# Patient Record
Sex: FEMALE | Race: BLACK OR AFRICAN AMERICAN | Employment: UNEMPLOYED | URBAN - METROPOLITAN AREA
[De-identification: names, ages, dates, MRNs, and addresses within clinical notes are randomized per-mention and may not be internally consistent; named-entity substitution may affect disease eponyms.]

---

## 2020-01-23 LAB
HBV SURFACE AG SERPL QL IA: NON REACTIVE
HIV 1+2 AB+HIV1 P24 AG SERPL QL IA: NON REACTIVE

## 2020-08-02 ENCOUNTER — ANESTHESIA EVENT (OUTPATIENT)
Dept: OBGYN | Facility: CLINIC | Age: 20
End: 2020-08-02
Payer: COMMERCIAL

## 2020-08-02 ENCOUNTER — ANESTHESIA (OUTPATIENT)
Dept: OBGYN | Facility: CLINIC | Age: 20
End: 2020-08-02
Payer: COMMERCIAL

## 2020-08-02 ENCOUNTER — HOSPITAL ENCOUNTER (INPATIENT)
Facility: CLINIC | Age: 20
LOS: 1 days | Discharge: HOME HEALTH | End: 2020-08-03
Attending: OBSTETRICS & GYNECOLOGY | Admitting: OBSTETRICS & GYNECOLOGY
Payer: COMMERCIAL

## 2020-08-02 PROBLEM — Z36.89 ENCOUNTER FOR TRIAGE IN PREGNANT PATIENT: Status: ACTIVE | Noted: 2020-08-02

## 2020-08-02 PROBLEM — Z34.90 PREGNANCY: Status: ACTIVE | Noted: 2020-08-02

## 2020-08-02 LAB
ABO + RH BLD: NORMAL
ABO + RH BLD: NORMAL
BLD GP AB SCN SERPL QL: NORMAL
BLOOD BANK CMNT PATIENT-IMP: NORMAL
ERYTHROCYTE [DISTWIDTH] IN BLOOD BY AUTOMATED COUNT: 12.8 % (ref 10–15)
HCT VFR BLD AUTO: 38.8 % (ref 35–47)
HGB BLD-MCNC: 12.9 G/DL (ref 11.7–15.7)
LABORATORY COMMENT REPORT: NORMAL
MCH RBC QN AUTO: 29.7 PG (ref 26.5–33)
MCHC RBC AUTO-ENTMCNC: 33.2 G/DL (ref 31.5–36.5)
MCV RBC AUTO: 89 FL (ref 78–100)
PLATELET # BLD AUTO: 209 10E9/L (ref 150–450)
RBC # BLD AUTO: 4.34 10E12/L (ref 3.8–5.2)
SARS-COV-2 RNA SPEC QL NAA+PROBE: NEGATIVE
SARS-COV-2 RNA SPEC QL NAA+PROBE: NORMAL
SPECIMEN EXP DATE BLD: NORMAL
SPECIMEN SOURCE: NORMAL
SPECIMEN SOURCE: NORMAL
WBC # BLD AUTO: 8.1 10E9/L (ref 4–11)

## 2020-08-02 PROCEDURE — 86780 TREPONEMA PALLIDUM: CPT | Performed by: OBSTETRICS & GYNECOLOGY

## 2020-08-02 PROCEDURE — 86900 BLOOD TYPING SEROLOGIC ABO: CPT | Performed by: OBSTETRICS & GYNECOLOGY

## 2020-08-02 PROCEDURE — 86850 RBC ANTIBODY SCREEN: CPT | Performed by: OBSTETRICS & GYNECOLOGY

## 2020-08-02 PROCEDURE — 00HU33Z INSERTION OF INFUSION DEVICE INTO SPINAL CANAL, PERCUTANEOUS APPROACH: ICD-10-PCS | Performed by: ANESTHESIOLOGY

## 2020-08-02 PROCEDURE — 25000125 ZZHC RX 250: Performed by: ANESTHESIOLOGY

## 2020-08-02 PROCEDURE — 87653 STREP B DNA AMP PROBE: CPT | Performed by: OBSTETRICS & GYNECOLOGY

## 2020-08-02 PROCEDURE — 86901 BLOOD TYPING SEROLOGIC RH(D): CPT | Performed by: OBSTETRICS & GYNECOLOGY

## 2020-08-02 PROCEDURE — U0003 INFECTIOUS AGENT DETECTION BY NUCLEIC ACID (DNA OR RNA); SEVERE ACUTE RESPIRATORY SYNDROME CORONAVIRUS 2 (SARS-COV-2) (CORONAVIRUS DISEASE [COVID-19]), AMPLIFIED PROBE TECHNIQUE, MAKING USE OF HIGH THROUGHPUT TECHNOLOGIES AS DESCRIBED BY CMS-2020-01-R: HCPCS | Performed by: OBSTETRICS & GYNECOLOGY

## 2020-08-02 PROCEDURE — 25000125 ZZHC RX 250: Performed by: OBSTETRICS & GYNECOLOGY

## 2020-08-02 PROCEDURE — 0KQM0ZZ REPAIR PERINEUM MUSCLE, OPEN APPROACH: ICD-10-PCS | Performed by: OBSTETRICS & GYNECOLOGY

## 2020-08-02 PROCEDURE — 88307 TISSUE EXAM BY PATHOLOGIST: CPT | Performed by: OBSTETRICS & GYNECOLOGY

## 2020-08-02 PROCEDURE — 40000977 ZZH STATISTIC ATTENDANCE AT DELIVERY

## 2020-08-02 PROCEDURE — 85027 COMPLETE CBC AUTOMATED: CPT | Performed by: STUDENT IN AN ORGANIZED HEALTH CARE EDUCATION/TRAINING PROGRAM

## 2020-08-02 PROCEDURE — 88307 TISSUE EXAM BY PATHOLOGIST: CPT | Mod: 26 | Performed by: OBSTETRICS & GYNECOLOGY

## 2020-08-02 PROCEDURE — 72200001 ZZH LABOR CARE VAGINAL DELIVERY SINGLE

## 2020-08-02 PROCEDURE — 25000128 H RX IP 250 OP 636: Performed by: ANESTHESIOLOGY

## 2020-08-02 PROCEDURE — 59409 OBSTETRICAL CARE: CPT | Mod: GC | Performed by: OBSTETRICS & GYNECOLOGY

## 2020-08-02 PROCEDURE — 25000132 ZZH RX MED GY IP 250 OP 250 PS 637: Performed by: OBSTETRICS & GYNECOLOGY

## 2020-08-02 PROCEDURE — 12000001 ZZH R&B MED SURG/OB UMMC

## 2020-08-02 PROCEDURE — G0463 HOSPITAL OUTPT CLINIC VISIT: HCPCS

## 2020-08-02 PROCEDURE — 3E0R3BZ INTRODUCTION OF ANESTHETIC AGENT INTO SPINAL CANAL, PERCUTANEOUS APPROACH: ICD-10-PCS | Performed by: ANESTHESIOLOGY

## 2020-08-02 RX ORDER — HYDROCORTISONE 2.5 %
CREAM (GRAM) TOPICAL 3 TIMES DAILY PRN
Status: DISCONTINUED | OUTPATIENT
Start: 2020-08-02 | End: 2020-08-03 | Stop reason: HOSPADM

## 2020-08-02 RX ORDER — LIDOCAINE HYDROCHLORIDE 10 MG/ML
INJECTION, SOLUTION EPIDURAL; INFILTRATION; INTRACAUDAL; PERINEURAL
Status: DISCONTINUED
Start: 2020-08-02 | End: 2020-08-02 | Stop reason: HOSPADM

## 2020-08-02 RX ORDER — EPHEDRINE SULFATE 50 MG/ML
INJECTION, SOLUTION INTRAMUSCULAR; INTRAVENOUS; SUBCUTANEOUS
Status: DISCONTINUED
Start: 2020-08-02 | End: 2020-08-02 | Stop reason: WASHOUT

## 2020-08-02 RX ORDER — CARBOPROST TROMETHAMINE 250 UG/ML
250 INJECTION, SOLUTION INTRAMUSCULAR
Status: DISCONTINUED | OUTPATIENT
Start: 2020-08-02 | End: 2020-08-03

## 2020-08-02 RX ORDER — OXYTOCIN 10 [USP'U]/ML
INJECTION, SOLUTION INTRAMUSCULAR; INTRAVENOUS
Status: DISCONTINUED
Start: 2020-08-02 | End: 2020-08-02 | Stop reason: WASHOUT

## 2020-08-02 RX ORDER — METHYLERGONOVINE MALEATE 0.2 MG/ML
200 INJECTION INTRAVENOUS
Status: DISCONTINUED | OUTPATIENT
Start: 2020-08-02 | End: 2020-08-03

## 2020-08-02 RX ORDER — CITRIC ACID/SODIUM CITRATE 334-500MG
30 SOLUTION, ORAL ORAL ONCE
Status: DISCONTINUED | OUTPATIENT
Start: 2020-08-02 | End: 2020-08-03

## 2020-08-02 RX ORDER — AMOXICILLIN 250 MG
2 CAPSULE ORAL 2 TIMES DAILY
Status: DISCONTINUED | OUTPATIENT
Start: 2020-08-02 | End: 2020-08-03 | Stop reason: HOSPADM

## 2020-08-02 RX ORDER — SODIUM CHLORIDE, SODIUM LACTATE, POTASSIUM CHLORIDE, CALCIUM CHLORIDE 600; 310; 30; 20 MG/100ML; MG/100ML; MG/100ML; MG/100ML
INJECTION, SOLUTION INTRAVENOUS
Status: DISCONTINUED
Start: 2020-08-02 | End: 2020-08-02 | Stop reason: HOSPADM

## 2020-08-02 RX ORDER — LIDOCAINE HYDROCHLORIDE 10 MG/ML
INJECTION, SOLUTION EPIDURAL; INFILTRATION; INTRACAUDAL; PERINEURAL PRN
Status: DISCONTINUED | OUTPATIENT
Start: 2020-08-02 | End: 2020-08-03 | Stop reason: HOSPADM

## 2020-08-02 RX ORDER — NALBUPHINE HYDROCHLORIDE 20 MG/ML
2.5-5 INJECTION, SOLUTION INTRAMUSCULAR; INTRAVENOUS; SUBCUTANEOUS EVERY 6 HOURS PRN
Status: DISCONTINUED | OUTPATIENT
Start: 2020-08-02 | End: 2020-08-03

## 2020-08-02 RX ORDER — NALOXONE HYDROCHLORIDE 0.4 MG/ML
.1-.4 INJECTION, SOLUTION INTRAMUSCULAR; INTRAVENOUS; SUBCUTANEOUS
Status: DISCONTINUED | OUTPATIENT
Start: 2020-08-02 | End: 2020-08-03 | Stop reason: HOSPADM

## 2020-08-02 RX ORDER — OXYTOCIN/0.9 % SODIUM CHLORIDE 30/500 ML
100 PLASTIC BAG, INJECTION (ML) INTRAVENOUS CONTINUOUS
Status: DISCONTINUED | OUTPATIENT
Start: 2020-08-02 | End: 2020-08-03 | Stop reason: HOSPADM

## 2020-08-02 RX ORDER — NALOXONE HYDROCHLORIDE 0.4 MG/ML
.1-.4 INJECTION, SOLUTION INTRAMUSCULAR; INTRAVENOUS; SUBCUTANEOUS
Status: DISCONTINUED | OUTPATIENT
Start: 2020-08-02 | End: 2020-08-02

## 2020-08-02 RX ORDER — IBUPROFEN 800 MG/1
800 TABLET, FILM COATED ORAL EVERY 6 HOURS PRN
Status: DISCONTINUED | OUTPATIENT
Start: 2020-08-02 | End: 2020-08-03 | Stop reason: HOSPADM

## 2020-08-02 RX ORDER — OXYTOCIN 10 [USP'U]/ML
10 INJECTION, SOLUTION INTRAMUSCULAR; INTRAVENOUS
Status: DISCONTINUED | OUTPATIENT
Start: 2020-08-02 | End: 2020-08-03 | Stop reason: HOSPADM

## 2020-08-02 RX ORDER — NALBUPHINE HYDROCHLORIDE 20 MG/ML
2.5-5 INJECTION, SOLUTION INTRAMUSCULAR; INTRAVENOUS; SUBCUTANEOUS EVERY 6 HOURS PRN
Status: DISCONTINUED | OUTPATIENT
Start: 2020-08-02 | End: 2020-08-02

## 2020-08-02 RX ORDER — ONDANSETRON 2 MG/ML
4 INJECTION INTRAMUSCULAR; INTRAVENOUS EVERY 6 HOURS PRN
Status: DISCONTINUED | OUTPATIENT
Start: 2020-08-02 | End: 2020-08-03

## 2020-08-02 RX ORDER — CHOLECALCIFEROL (VITAMIN D3) 50 MCG
1 TABLET ORAL DAILY
COMMUNITY

## 2020-08-02 RX ORDER — LIDOCAINE HYDROCHLORIDE AND EPINEPHRINE 15; 5 MG/ML; UG/ML
INJECTION, SOLUTION EPIDURAL PRN
Status: DISCONTINUED | OUTPATIENT
Start: 2020-08-02 | End: 2020-08-03 | Stop reason: HOSPADM

## 2020-08-02 RX ORDER — OXYCODONE AND ACETAMINOPHEN 5; 325 MG/1; MG/1
1 TABLET ORAL
Status: DISCONTINUED | OUTPATIENT
Start: 2020-08-02 | End: 2020-08-03

## 2020-08-02 RX ORDER — LIDOCAINE 40 MG/G
CREAM TOPICAL
Status: DISCONTINUED | OUTPATIENT
Start: 2020-08-02 | End: 2020-08-03

## 2020-08-02 RX ORDER — MISOPROSTOL 200 UG/1
TABLET ORAL
Status: DISCONTINUED
Start: 2020-08-02 | End: 2020-08-02 | Stop reason: WASHOUT

## 2020-08-02 RX ORDER — EPHEDRINE SULFATE 50 MG/ML
5 INJECTION, SOLUTION INTRAMUSCULAR; INTRAVENOUS; SUBCUTANEOUS
Status: DISCONTINUED | OUTPATIENT
Start: 2020-08-02 | End: 2020-08-03

## 2020-08-02 RX ORDER — EPHEDRINE SULFATE 50 MG/ML
5 INJECTION, SOLUTION INTRAMUSCULAR; INTRAVENOUS; SUBCUTANEOUS
Status: DISCONTINUED | OUTPATIENT
Start: 2020-08-02 | End: 2020-08-02

## 2020-08-02 RX ORDER — NALOXONE HYDROCHLORIDE 0.4 MG/ML
.1-.4 INJECTION, SOLUTION INTRAMUSCULAR; INTRAVENOUS; SUBCUTANEOUS
Status: DISCONTINUED | OUTPATIENT
Start: 2020-08-02 | End: 2020-08-03

## 2020-08-02 RX ORDER — ACETAMINOPHEN 325 MG/1
650 TABLET ORAL EVERY 4 HOURS PRN
Status: DISCONTINUED | OUTPATIENT
Start: 2020-08-02 | End: 2020-08-03

## 2020-08-02 RX ORDER — OXYTOCIN/0.9 % SODIUM CHLORIDE 30/500 ML
PLASTIC BAG, INJECTION (ML) INTRAVENOUS
Status: DISCONTINUED
Start: 2020-08-02 | End: 2020-08-02 | Stop reason: HOSPADM

## 2020-08-02 RX ORDER — OXYTOCIN 10 [USP'U]/ML
10 INJECTION, SOLUTION INTRAMUSCULAR; INTRAVENOUS
Status: DISCONTINUED | OUTPATIENT
Start: 2020-08-02 | End: 2020-08-03

## 2020-08-02 RX ORDER — FENTANYL/BUPIVACAINE/NS/PF 2-1250MCG
PLASTIC BAG, INJECTION (ML) INJECTION
Status: COMPLETED
Start: 2020-08-02 | End: 2020-08-02

## 2020-08-02 RX ORDER — OXYTOCIN/0.9 % SODIUM CHLORIDE 30/500 ML
340 PLASTIC BAG, INJECTION (ML) INTRAVENOUS CONTINUOUS PRN
Status: DISCONTINUED | OUTPATIENT
Start: 2020-08-02 | End: 2020-08-03 | Stop reason: HOSPADM

## 2020-08-02 RX ORDER — MODIFIED LANOLIN
OINTMENT (GRAM) TOPICAL
Status: DISCONTINUED | OUTPATIENT
Start: 2020-08-02 | End: 2020-08-03 | Stop reason: HOSPADM

## 2020-08-02 RX ORDER — OXYTOCIN/0.9 % SODIUM CHLORIDE 30/500 ML
100-340 PLASTIC BAG, INJECTION (ML) INTRAVENOUS CONTINUOUS PRN
Status: COMPLETED | OUTPATIENT
Start: 2020-08-02 | End: 2020-08-02

## 2020-08-02 RX ORDER — BISACODYL 10 MG
10 SUPPOSITORY, RECTAL RECTAL DAILY PRN
Status: DISCONTINUED | OUTPATIENT
Start: 2020-08-04 | End: 2020-08-03 | Stop reason: HOSPADM

## 2020-08-02 RX ORDER — AMOXICILLIN 250 MG
1 CAPSULE ORAL 2 TIMES DAILY
Status: DISCONTINUED | OUTPATIENT
Start: 2020-08-02 | End: 2020-08-03 | Stop reason: HOSPADM

## 2020-08-02 RX ORDER — IBUPROFEN 800 MG/1
800 TABLET, FILM COATED ORAL
Status: DISCONTINUED | OUTPATIENT
Start: 2020-08-02 | End: 2020-08-03

## 2020-08-02 RX ORDER — ACETAMINOPHEN 325 MG/1
650 TABLET ORAL EVERY 4 HOURS PRN
Status: DISCONTINUED | OUTPATIENT
Start: 2020-08-02 | End: 2020-08-03 | Stop reason: HOSPADM

## 2020-08-02 RX ORDER — SODIUM CHLORIDE, SODIUM LACTATE, POTASSIUM CHLORIDE, CALCIUM CHLORIDE 600; 310; 30; 20 MG/100ML; MG/100ML; MG/100ML; MG/100ML
INJECTION, SOLUTION INTRAVENOUS CONTINUOUS
Status: DISCONTINUED | OUTPATIENT
Start: 2020-08-02 | End: 2020-08-03

## 2020-08-02 RX ADMIN — LIDOCAINE HYDROCHLORIDE 1 ML: 10 INJECTION, SOLUTION EPIDURAL; INFILTRATION; INTRACAUDAL; PERINEURAL at 13:12

## 2020-08-02 RX ADMIN — Medication 340 ML/HR: at 14:07

## 2020-08-02 RX ADMIN — LIDOCAINE HYDROCHLORIDE,EPINEPHRINE BITARTRATE 3 ML: 15; .005 INJECTION, SOLUTION EPIDURAL; INFILTRATION; INTRACAUDAL; PERINEURAL at 13:10

## 2020-08-02 RX ADMIN — ACETAMINOPHEN 650 MG: 325 TABLET ORAL at 23:59

## 2020-08-02 RX ADMIN — LIDOCAINE HYDROCHLORIDE,EPINEPHRINE BITARTRATE 2 ML: 15; .005 INJECTION, SOLUTION EPIDURAL; INFILTRATION; INTRACAUDAL; PERINEURAL at 13:12

## 2020-08-02 RX ADMIN — LIDOCAINE HYDROCHLORIDE 10 ML: 10 INJECTION, SOLUTION EPIDURAL; INFILTRATION; INTRACAUDAL; PERINEURAL at 14:18

## 2020-08-02 RX ADMIN — Medication 10 ML/HR: at 13:14

## 2020-08-02 RX ADMIN — IBUPROFEN 800 MG: 800 TABLET, FILM COATED ORAL at 19:30

## 2020-08-02 RX ADMIN — DOCUSATE SODIUM 50 MG AND SENNOSIDES 8.6 MG 1 TABLET: 8.6; 5 TABLET, FILM COATED ORAL at 19:32

## 2020-08-02 SDOH — HEALTH STABILITY: MENTAL HEALTH: HOW OFTEN DO YOU HAVE A DRINK CONTAINING ALCOHOL?: NEVER

## 2020-08-02 NOTE — PLAN OF CARE
Data: Bibiana Alberto transferred to [id number] via wheelchair at 1615. Baby transferred via parent's arms.  Action: Receiving unit notified of transfer: Yes. Patient and family notified of room change. Report given to Kiki Ferrari at 1620. Belongings sent to receiving unit. Accompanied by Registered Nurse. Oriented patient to surroundings. Call light within reach. ID bands double-checked with receiving RN.  Response: Patient tolerated transfer and is stable.

## 2020-08-02 NOTE — DISCHARGE SUMMARY
[facility] Discharge Summary    Bibiana Alberto MRN# [id number]   Age: 20 year old YOB: 2000     Date of Admission:  2020  Date of Discharge::  8/3/2020  Admitting Physician:  Cheri Aldridge MD  Discharge Physician:  Tawny Fuller MD          Admission Diagnoses:   -IUP at 40w2d  -Sporadic prenatal care  -GBS unknown  -Bilobed vs accessory lobe placenta          Discharge Diagnosis:   -IUP at 40w2d, now delivered  - Elevated blood pressure <4 hours apart  - GBS negative          Procedures:   Procedure(s): -  -Epidural          Medications Prior to Admission:     No medications prior to admission.             Discharge Medications:        Review of your medicines      START taking      Dose / Directions   acetaminophen 325 MG tablet  Commonly known as:  TYLENOL  Used for:   (normal spontaneous vaginal delivery)      Dose:  650 mg  Take 2 tablets (650 mg) by mouth every 6 hours as needed for mild pain Start after Delivery.  Quantity:  100 tablet  Refills:  0     ibuprofen 600 MG tablet  Commonly known as:  ADVIL/MOTRIN  Used for:   (normal spontaneous vaginal delivery)      Dose:  600 mg  Take 1 tablet (600 mg) by mouth every 6 hours as needed for moderate pain Start after delivery  Quantity:  60 tablet  Refills:  0     senna-docusate 8.6-50 MG tablet  Commonly known as:  SENOKOT-S/PERICOLACE  Used for:   (normal spontaneous vaginal delivery)      Dose:  1 tablet  Take 1 tablet by mouth daily Start after delivery.  Quantity:  100 tablet  Refills:  0        CONTINUE these medicines which have NOT CHANGED      Dose / Directions   vitamin D3 50 mcg (2000 units) tablet  Commonly known as:  CHOLECALCIFEROL      Dose:  1 tablet  Take 1 tablet by mouth daily  Refills:  0           Where to get your medicines      These medications were sent to [facility] [address]  [address] [address]    Phone:  [phone]      acetaminophen 325 MG tablet    ibuprofen 600 MG tablet    senna-docusate 8.6-50 MG tablet             Consultations:   Anesthesia          Brief Admission History   Bibiana Alberto is a 20 year old  at 40w2d by LMP c/w 13w4d US who presents today in spontaneous labor. Started having contractions this morning at 9:00, becoming more frequent and painful throughout the day. Denies any VB, some increased vaginal discharge this morning but no LOF. Good FM. Denies SOB, chest pain, LE swelling/tenderness.  No concerns for headache, vision changes, RUQ or epigastric pain.         Brief Intrapartum Course:   Bibiana Alberto is a 20 year old , that initially presented to L&D in spontaneous labor. On admission her cervix was 6 cm dilated by RN. She progressed quickly to 9 cm and was feeling increased pressure. FHT notable for several prolonged decelerations, but with moderate variability, category II. For analgesia she received an epidural. At 13:15 she was found to be complete and immediately started pushing. She moved through second stage of labor with good maternal effort. At 14:06 she delivered a vigorous male infant in the HAI position. Shoulders delivered with ease. A body nuchal cord was noted and delivered through. Infant to mother's abdomen. Delayed cord clamping performed. Apgars were 8 and 9. Weight was 3280 g. Routine cord blood obtained. Placenta delivered spontaneously intact with 3-vessel cord. Placenta was noted to be intact with a small accessory lobe. Upon inspection of the perineum, a second degree laceration was noted. This was repaired with a 3-0 vicryl in the standard fashion and was hemostatic. Fundus firm. QBL 71 ml. Final counts correct. Dr. Aldridge was present for the entire delivery and repair.           Hospital Course:   The patient's hospital course was unremarkable.  On discharge, her pain was well controlled. Vaginal bleeding is similar to peak menstrual flow.  Voiding without  difficulty.  Ambulating well and tolerating a normal diet.  No fever.  Breastfeeding well.  Infant is stable.  No bowel movement yet.*  She was discharged on post-partum day #1.    Post-partum hemoglobin: 11.5    Declined birth control, partner in [place] and planning to return in 3 months          Discharge Instructions and Follow-Up:   Discharge diet: Regular   Discharge activity: Pelvic rest for 6 weeks including no sexual intercourse, tampons, or douching.    Discharge follow-up: Follow up with your primary OB for a routine postpartum visit in 6 weeks  Follow up in 1 week for BP check           Discharge Disposition:   Discharged to home in stable condition      Mirta Reyes MD PGY2  Obstetrics & Gynecology  08/06/20

## 2020-08-02 NOTE — ANESTHESIA PROCEDURE NOTES
Epidural Procedure Note  Staff -   Anesthesiologist:  Lizz Peacock MD      Performed By: anesthesiologist          Location: OB     Procedure start time:  8/2/2020 1:05 PM     Procedure end time:  8/2/2020 1:10 PM   Pre-procedure checklist:   patient identified, IV checked, site marked, risks and benefits discussed, informed consent, monitors and equipment checked, pre-op evaluation and at physician/surgeon's request      Correct Patient: Yes      Correct Position: Yes      Correct Site: Yes      Correct Procedure: Yes      Correct Laterality:  Yes    Site Marked:  Yes  Procedure:     Procedure:  Epidural catheter    ASA:  2    Diagnosis:  Labor    Position:  Sitting    Sterile Prep: chloraprep      Insertion site:  L3-4    Local skin infiltration:  1% lidocaine    amount (mL):  3    Approach:  Midline    Needle gauge (G):  17    Needle Length (in):  3.5    Block Needle Type:  Constantinuhtracey    Injection Technique:  LORT saline    FELICITAS at (cm):  7.5    Attempts:  1    Redirects:  0    Catheter gauge (G):  19    Catheter threaded easily: Yes      Threaded to cm at skin:  12.5    Threaded in epidural space (cm):  5    Paresthesias:  No    Aspiration negative for Heme or CSF: Yes      Test dose (mL):  3     Local anesthetic:  Lidocaine 1.5% w/ 1:200,000 epinephrine    Test dose time:  13:10    Test dose negative for signs of intravascular, subdural or intrathecal injection: Yes    Assessment/Narrative:      Patient tolerated procedure well. She was already 9cm dilated at time of epidural consult. Obstetricians evaluated and are ok with proceeding with epidural placement. Patient would like to have it done and able to sit still.   No paresthesia, no heme, no CSF. Patient immediately felt the urge to start pushing after the test dose - tegaderm placed at catheter insertion site and patient repositioned supine with RN. OB team at bedside. Infusion connected to epidural.

## 2020-08-02 NOTE — H&P
History and Physical     Bibiana Alberto MRN# [id number]   YOB: 2000 Age: 20 year old      Date of Admission: 2020    Primary care provider: [facility], [facility]      Assessment and Plan:       20 year old  at 40w2d by LMP c/w 13w4d US, here in spontaneous active labor. Pregnancy is notable for sporadic PNC.      Spontaneous Active Labor:  - 6 cm dilated by RN exam and painfully essie, will continue to monitor spontaneous labor  - Augmentation: pitocin and ROM prn after epidural placement   - Pain: would like epidural   - Anticipate      Sporadic PNC:  - Rh positive, Rubella immune, GCT nl, GBS unknown, Hgb 12.0 5/15/2020, Plts 273 2020  - Other prenatal labs wnl  - Imaging: Last US 3/2020 placenta posterior, bilobed vs accessory anterior lobe, EFW 87%ile    - No prior pap smear   - Contraception: will discuss PP  - Feeding: will discuss PP      FWB:   Cat II tracing, discontinuous but likely decel down to 60s lasting four minutes with spontaneous recovery;  Improvement with repositioning with moderate variability, no decelerations, and no accelerations.  cephalic by BSUS  - Continuous Fetal Monitoring  - Intrauterine resuscitative measures prn      Patient discussed with Dr. Cheri Aldridge           HPI:     Bibiana Alberto is a 20 year old  at 40w2d by LMP c/w 13w4d US who presents today in spontaneous labor. Started having contractions this morning at 9:00, becoming more frequent and painful throughout the day. Denies any VB, some increased vaginal discharge this morning but no LOF. Good FM. Denies SOB, chest pain, LE swelling/tenderness.  No concerns for headache, vision changes, RUQ or epigastric pain.    Pregnancy notable for:   -Sporadic prenatal care, seen at [facility] and [facility] (results in Care Everywhere)   -bilobed placenta  -EIF on ultrasound, normal NIPT    No prior pregnancies. No history of asthma or high blood pressure.    OB History:    OB History     Para Term  AB Living   1 0 0 0 0 0   SAB TAB Ectopic Multiple Live Births   0 0 0 0 0      # Outcome Date GA Lbr Eric/2nd Weight Sex Delivery Anes PTL Lv   1 Current                 Prenatal Lab Results:  ABO - O Rh-positive  Antibody negative  Rubella immune  Varicella immune   HepB non-reactive  HepC non-reactive   HIV negative   Gonorrhea negative  Chlamydia negative  Treponema negative   GCT nl, 118  GBS not collected, pt has not been seen since 2020    GBS Status:   No results found for: GBS             Past Medical History:   History reviewed. No pertinent past medical history.   Denies hx of HTN, asthma, or DM.           Past Surgical History:   History reviewed. No pertinent surgical history.          Social History:     Social History     Tobacco Use     Smoking status: Never Smoker     Smokeless tobacco: Never Used   Substance Use Topics     Alcohol use: Never     Frequency: Never             Family History:   History reviewed. No pertinent family history.          Immunizations:     There is no immunization history on file for this patient.         Allergies:   No Known Allergies          Medications:     Medications Prior to Admission   Medication Sig Dispense Refill Last Dose     vitamin D3 (CHOLECALCIFEROL) 50 mcg (2000 units) tablet Take 1 tablet by mouth daily   2020 at Unknown time             Review of Systems & Physical Exam:     The Review of Systems is negative other than noted in the HPI      /87   Temp 98.4  F (36.9  C) (Oral)   Resp 18   LMP 2019   Gen: Uncomfortable appearing  CV: Warm and well perfused   Lungs: Breathing comfortably on room air.   Abd: Gravid, non-tender, non-distended  Ext: Trace peripheral extremity edema    Cervix: 6 cm on RN exam, some yellowish vaginal discharge noted on exam appears to be meconium, GBS swab collected   Membranes: intact  Presentation: Cephalic by BSUS    FHT:  Monitoring External  FHT: Baseline 145 bpm; moderate  variability; no accels yet; one deceleration with alfonzo in 60s lasting 4 min with spontaneous recover   TOCO 3-4 contractions in 10 minutes         Data:   Hgb, Plt, T&S pending       Shayna Proctor MD  Ob/Gyn PGY1  12:07 PM 2020    Physician Attestation   I, Cheri Aldridge MD, personally examined and evaluated this patient.  I discussed the patient with the resident/fellow and care team, and agree with the assessment and plan of care as documented in the note of 20.      I personally reviewed vital signs, medications, labs and imaging.    Noriega findings: Bibiana Alberto is a 20 year old  at 40w2d by 13w4d us who presents in active spontaneous labor.  Vaginal discharge appears yellow and potentially meconium fluid.  FHT category 2 with likely prolonged deceleration and spontaneous recovery to normal baseline and moderate variability with repositioning.  Discontinuous strip at the time.  Patient with continued cervical change to 9/100/0 at 1250.  Expectant management.  Anticipate .   Cheri Aldridge MD  Date of Service (when I saw the patient): 20

## 2020-08-02 NOTE — PROGRESS NOTES
Brief Progress Note    Patient feeling pressure. Cervix 9/90/+1. Will attempt epidural placement. Patient to call out if feeling constant pressure. Cat II FHT with intermittent deep decels. Currently mod variability without decels for last 10 min.    Abiel Paniagua MD  OB/GYN Resident, PGY-3  8/2/2020 1:06 PM

## 2020-08-02 NOTE — L&D DELIVERY NOTE
DELIVERY SUMMARY    Bibiana Alberto MRN# [id number]   Age: 20 year old YOB: 2000     Delivery Note:   Bibiana Alberto is a 20 year old , that initially presented to L&D in spontaneous labor. On admission her cervix was 6 cm dilated by RN. She progressed quickly to 9 cm and was feeling increased pressure. For analgesia she received an epidural. At 13:15 she was found to be complete and immediately started pushing. She moved through second stage of labor with good maternal effort. At 14:06 she delivered a vigorous male infant in the HAI position. Shoulders delivered with ease. A body nuchal cord was noted and delivered through. Infant to mother's abdomen. Delayed cord clamping performed. Apgars were 8 and 9. Weight was 3280 g. Routine cord blood obtained. Placenta delivered spontaneously intact with 3-vessel cord. Placenta was noted to be intact with a small accessory lobe. Upon inspection of the perineum, a second degree laceration was noted. This was repaired with a 3-0 vicryl in the standard fashion and was hemostatic. Fundus firm. QBL 71 ml. Final counts correct. Dr. Cheri Aldridge was present for the entire delivery and repair.    Shayna Proctor MD  OBGYN PGY-1  3:13 PM 2020      Patel Assessment Tool Data    Gestational Age:  Gestational Age: 40w2d     Maternal temperature range:  Temp  Av.4  F (36.9  C)  Min: 98.4  F (36.9  C)  Max: 98.4  F (36.9  C)    Membranes ruptured for:   (Delivered) Hours: 5 Minutes: 6     GBS status:  No results found for: GBS    Antibiotic Status:  Antibiotics       IV Antibiotic Given       Additional Management     Fetal Status Prior to  Delivery Category 2    Fetal Status Comments intermittent category 2 FHT with spontaneous decelerations.  Recovery to normal baseline and moderate variability.        Sepsis Prebirth Score:      Sepsis Postbirth Score:      Determination based on clinical exam after birth:      Disposition:           Labor Event Times    Labor onset date:  20 Onset time:  10:00 AM   Dilation complete date:  20 Complete time:   1:15 PM   Start pushing date/time:  2020 1315      Labor Length    1st Stage (hrs):  3 (min):  15   2nd Stage (hrs):  0 (min):  51      Labor Events     labor?:  No   steroids:  None  Labor Type:  Spontaneous  Predominate monitoring during 1st stage:  continuous electronic fetal monitoring     Antibiotics received during labor?:  No     Rupture date/time: 20 0900   Rupture type:  Spontaneous rupture of membranes occuring during spontaneous labor or augmentation  Fluid color:  Meconium     Delivery/Placenta Date and Time    Delivery Date:  20 Delivery Time:   2:06 PM      Apgars    Living status:  Living   1 Minute 5 Minute 10 Minute 15 Minute 20 Minute   Skin color: 0  1       Heart rate: 2  2       Reflex irritability: 2  2       Muscle tone: 2  2       Respiratory effort: 2  2       Total: 8  9       Apgars assigned by:  MARLEN MURGUIA RN     Cord    Vessels:  3 Vessels Complications:  None   Cord Blood Disposition:  Lab Gases Sent?:  Yes      [place] Resuscitation    Methods:  None  [place] Care at Delivery:  Infant with spontaneous cry to mothers' abdomen where he was further dried and stimulated.     Skin to Skin and Feeding Plan    Skin to skin initiation date/time: 1841    Skin to skin with:  Mother  Skin to skin end date/time:     How do you plan to feed your baby:  Formula     Labor Events and Shoulder Dystocia    Fetal Tracing Prior to Delivery:  Category 2  Fetal Tracing Comments:  intermittent category 2 FHT with spontaneous decelerations.  Recovery to normal baseline and moderate variability.    Shoulder dystocia present?:  Neg     Delivery (Maternal) (Provider to Complete) ([id number])    Episiotomy:  None  Perineal lacerations:  2nd Repaired?:  Yes      Blood Loss  Mother: Bibiana Alberto #[id number]   Start of Mother's Information    IO Blood  Loss  08/02/20 1000 - 08/02/20 1457    Delivery QBL (mL) Hospital Encounter 71 mL    Total  71 mL         End of Mother's Information  Mother: Bibiana Alberto #[id number]         Delivery - Provider to Complete ([id number])    Delivering clinician:  Cheri Aldridge MD  Attempted Delivery Types (Choose all that apply):  Spontaneous Vaginal Delivery  Delivery Type (Choose the 1 that will go to the Birth History):  Vaginal, Spontaneous   Other personnel:   Provider Role   Pamela Paniagua MD Resident   Shayna Proctor MD Resident         Placenta    Delayed Cord Clamping:  Done  Removal:  Spontaneous  Comments:  accessory lobe visualized  Disposition:  Pathology     Anesthesia    Method:  Epidural  Cervical dilation at placement:  8-10          Presentation and Position    Presentation:  Vertex  Position:  Left Occiput Anterior           Shayna Proctor MD   Physician Attestation   I spent a total of 45 minutes with the patient, personally assisting as the resident performed normal spontaneous vaginal delivery and 2nd degree perineal laceration repair.     Cheri Aldridge  Date of Service (when I saw the patient): 8/2/2020

## 2020-08-02 NOTE — PROVIDER NOTIFICATION
08/02/20 1200   Provider Notification   Provider Name/Title Dr Paniagua   Method of Notification In Department   Request Evaluate in Person   Notification Reason Patient Arrived   Pt arrived for labor. Pt states she has been essie since 9am today. Denies leaking or bleeding. FHR reactive. Essie q2-4minutes. VSS.

## 2020-08-02 NOTE — ANESTHESIA PREPROCEDURE EVALUATION
Anesthesia Pre-Procedure Evaluation    Patient: Bibiana Alberto   MRN:     [id number] Gender:   female   Age:    20 year old :      2000        Preoperative Diagnosis: * No pre-op diagnosis entered *   * No procedures listed *     LABS:  CBC:   Lab Results   Component Value Date    WBC 8.1 2020    HGB 12.9 2020    HCT 38.8 2020     2020     BMP: No results found for: NA, POTASSIUM, CHLORIDE, CO2, BUN, CR, GLC  COAGS: No results found for: PTT, INR, FIBR  POC: No results found for: BGM, HCG, HCGS  OTHER: No results found for: PH, LACT, A1C, SHAISTA, PHOS, MAG, ALBUMIN, PROTTOTAL, ALT, AST, GGT, ALKPHOS, BILITOTAL, BILIDIRECT, LIPASE, AMYLASE, KAITLYNN, TSH, T4, T3, CRP, SED     Preop Vitals    BP Readings from Last 3 Encounters:   20 131/87    Pulse Readings from Last 3 Encounters:   No data found for Pulse      Resp Readings from Last 3 Encounters:   20 18    SpO2 Readings from Last 3 Encounters:   No data found for SpO2      Temp Readings from Last 1 Encounters:   20 36.9  C (98.4  F) (Oral)    Ht Readings from Last 1 Encounters:   No data found for Ht      Wt Readings from Last 1 Encounters:   No data found for Wt    There is no height or weight on file to calculate BMI.     LDA:  Intrathecal/Epidural Catheter 20 (Active)   Number of days: 0        History reviewed. No pertinent past medical history.   History reviewed. No pertinent surgical history.   No Known Allergies     Anesthesia Evaluation       history and physical reviewed .      No history of anesthetic complications          ROS/MED HX    ENT/Pulmonary:  - neg pulmonary ROS     Neurologic:  - neg neurologic ROS     Cardiovascular:  - neg cardiovascular ROS       METS/Exercise Tolerance:     Hematologic:         Musculoskeletal:         GI/Hepatic:  - neg GI/hepatic ROS       Renal/Genitourinary:         Endo:         Psychiatric:         Infectious Disease:         Malignancy:         Other:                      JBARBARAG FV AN PHYSICAL EXAM    Assessment:   ASA SCORE: 2            PONV Management: Adult Risk Factors: Female       Comments for Plan/Consent:  Discussed risks of epidural including need for replacement, low blood pressure, bleeding, infection, nerve injury, headache and possible treatment with blood patch.             neg OB MADAN Peacock MD

## 2020-08-02 NOTE — PROGRESS NOTES
Brief Progress Note  Into room to obtain release of information consent, while in the room pt had a second deceleration to 90s. Continues to have painful contractions.    Pt rotated to L side with some improvement of FHT.   SVE: 9/90/0 by Dr. Aldridge   Further review of discharge noted on initial exam appears consistent with meconium stained fluid  FHT: baseline 130, moderate variability, no accels, second long decel of 4 min with alfonzo to 90s, recovery with maternal position change.    Placement of maternal pulse ox for improvement of monitoring, running IVF for epidural  Reviewed possibility of no epidural prior to delivery, but will attempt placement with anesthesia    Shayna Proctor MD  OBGYN PGY-1  1:01 PM August 2, 2020

## 2020-08-03 VITALS
RESPIRATION RATE: 18 BRPM | OXYGEN SATURATION: 100 % | TEMPERATURE: 98.3 F | DIASTOLIC BLOOD PRESSURE: 74 MMHG | HEART RATE: 76 BPM | SYSTOLIC BLOOD PRESSURE: 130 MMHG

## 2020-08-03 LAB
GP B STREP DNA SPEC QL NAA+PROBE: NEGATIVE
HGB BLD-MCNC: 11.5 G/DL (ref 11.7–15.7)
SPECIMEN SOURCE: NORMAL
T PALLIDUM AB SER QL: NONREACTIVE

## 2020-08-03 PROCEDURE — 85018 HEMOGLOBIN: CPT | Performed by: OBSTETRICS & GYNECOLOGY

## 2020-08-03 PROCEDURE — 36416 COLLJ CAPILLARY BLOOD SPEC: CPT | Performed by: OBSTETRICS & GYNECOLOGY

## 2020-08-03 PROCEDURE — 25000132 ZZH RX MED GY IP 250 OP 250 PS 637: Performed by: OBSTETRICS & GYNECOLOGY

## 2020-08-03 RX ORDER — ACETAMINOPHEN 325 MG/1
650 TABLET ORAL EVERY 6 HOURS PRN
Qty: 100 TABLET | Refills: 0 | Status: SHIPPED | OUTPATIENT
Start: 2020-08-03

## 2020-08-03 RX ORDER — AMOXICILLIN 250 MG
1 CAPSULE ORAL DAILY
Qty: 100 TABLET | Refills: 0 | Status: SHIPPED | OUTPATIENT
Start: 2020-08-03

## 2020-08-03 RX ORDER — IBUPROFEN 600 MG/1
600 TABLET, FILM COATED ORAL EVERY 6 HOURS PRN
Qty: 60 TABLET | Refills: 0 | Status: SHIPPED | OUTPATIENT
Start: 2020-08-03

## 2020-08-03 RX ADMIN — IBUPROFEN 800 MG: 800 TABLET, FILM COATED ORAL at 07:47

## 2020-08-03 NOTE — PROGRESS NOTES
SPIRITUAL HEALTH SERVICES: Tele-Encounter  Patient Location ([facility], Unit): [facility]  Spoke with (patient, family relationship): Pt's friend      Referral Source: Referral by lead ugo.    If applicable: patient was appropriately screened for telechaplaincy support with bedside nurse prior to visit (e.g. Mental Health and Addiction contexts). See call details below.    DATA:  I spoke to the pt's friend,she said both mother and baby are doing very well.She was great full to the staff and their hospitality.she also shared that the pt has great support at home.We prayed together to Allah, that may Allah bless them with good health, and a lots of happiness.       PLAN:  Follow up as needed.SH is always available for support.    Lorin Websterlain Intern   ______________________________    Type of service:  Telephone Visit     has received verbal consent for a TelephoneVisit from the patient?     Distance Provider Location: designated [place] office or home office (secure setting)    Mode of Communication: telephone (via [place]: online travellers club phone or [email or web address] tele-call-number ([phone])    * [facility] remains available 24/7 for emergent requests/referrals, either by having the switchboard page the on-call  or by entering an ASAP/STAT consult in Epic (this will also page the on-call ). Routine Epic consults receive an initial response within 24 hours.*

## 2020-08-03 NOTE — PLAN OF CARE
Home care referral sent to  home care for early discharge, first time breastfeeding mom and bili draw. Spoke with Dilan from home care and they will come out to pts house on Wednesday for a bili draw. Peds MD info given so they can call results to doctor. Bedside RN Corey updated on this plan and will update pt on this plan.

## 2020-08-03 NOTE — PROVIDER NOTIFICATION
08/03/20 1311   Provider Notification   Provider Name/Title Dr Fuller   Method of Notification Electronic Page   Notification Reason Status Update   GBS resulted negative. Pt would like to discharge early evening.

## 2020-08-03 NOTE — PLAN OF CARE
Patient arrived to [facility] unit via wheelchair at 1630,with belongings, accompanied by family, with infant in arms. Received report from SARITA Berger and checked bands. Unit and room orientation completd. Call light given; no concerns present at this time. Continue with plan of care.

## 2020-08-03 NOTE — PROGRESS NOTES
[facility]   Post-partum Note    Name:  Bibiana Alberto  MRN: [id number]    S: Patient doing well.  Pain well controlled.  Tolerating regular diet without nausea or vomiting.  Ambulating without dizziness.  Lochia moderate.  Breast feeding.  Plans discharge today if possible.    O:   Patient Vitals for the past 24 hrs:   BP Temp Temp src Pulse Resp SpO2   20 2349 130/77 98.4  F (36.9  C) Oral 72 16 --   20 116/78 98.1  F (36.7  C) Oral 80 16 100 %   20 1636 126/85 98.1  F (36.7  C) Oral 76 16 100 %   20 1555 116/57 -- -- -- -- 100 %   20 1540 117/57 -- -- -- -- 100 %   20 1525 125/58 -- -- -- -- 100 %   20 1510 126/58 -- -- -- -- 100 %   20 1455 139/60 -- -- -- -- 100 %   20 1440 126/59 99  F (37.2  C) Oral -- 18 100 %   20 1425 (!) 140/60 -- -- -- -- 100 %   20 1410 131/61 -- -- -- -- 100 %   20 1351 138/63 -- -- -- -- 100 %   20 1346 132/62 -- -- -- -- 100 %   20 1343 129/61 -- -- -- -- --   20 1335 132/69 -- -- -- -- --   20 1329 (!) 140/73 -- -- -- -- --   20 1327 (!) 141/75 -- -- -- -- --   20 1322 (!) 153/71 -- -- -- -- 100 %   20 1315 (!) 158/70 -- -- -- -- --   20 1313 (!) 162/84 -- -- -- -- --   20 1312 (!) 174/99 -- -- -- -- --   20 1200 131/87 98.4  F (36.9  C) Oral -- 18 --     Gen:  Resting comfortably, NAD  CV:  RRR, no murmur  Pulm:  CTAB, no wheezes  Abd:  Soft, appropriately ttp, non-distended.Fundus at umbilicus, firm and non-tender.  Ext:  non-tender, trace LE edema b/l    I/O last 3 completed shifts:  In: -   Out: 480 [Urine:150; Blood:330]    Hgb:   Hemoglobin   Date Value Ref Range Status   2020 12.9 11.7 - 15.7 g/dL Final       Assessment/Plan:  20 year old  on PPD #1 s/p .  - continue with routine postpartum management  Pain: Well-controlled with ibuprofen and tylenol  Hgb: Hgb 12.9> QBL 71> AM pending    Rh: pos  Rubella: imm  Feed: breast  BC: declines  Dispo: DC PPD#1-2    #Scant PNC  - SW PP    #Elevated BP <4 hrs apart intrapartum  - Continue to monitor  - No s/sx preE     Deisy Cruz MD  OB/GYN PGY-3  08/03/20 6:48 AM       Physician Attestation   I, Tawny Fuller, personally saw and evaluated Bibiana Alberto.  I have reviewed her labs, vital signs and performed an exam.  She is doing very well.  Anxious to go home. Her GBS test came back negative.  Waiting to see if the baby can go home today.  I discussed contraception with her and she is undecided.   Her partner is in [place] and will likely be coming back in about 3 months. I encouraged her to make a PP appt in 4-6 wks.  Will get home care for BP check after discharge. I have reviewed the above note and agree with details and plan for discharge.   Tawny Fuller MD  August 3, 2020

## 2020-08-03 NOTE — PLAN OF CARE
Pt stable PPD#1. Up ad javier in room caring for self and baby. Voiding without difficulty and reports having normal BM. Took ibuprofen for mild discomfort in morning and declined afternoon dose. Has been formula feeding since delivery. Had EDS score of 9, declined social work visit, states she has support available. Encouraged to watch education videos and work on birth certificate form. Pt is requesting to go home this evening if infant's bilirubin level is low risk.

## 2020-08-03 NOTE — PLAN OF CARE
Postpartum stable. Voiding adequate amounts. Pain/cramping is tolerable with ibuprofen. PIV SL. Ambulating independently in room. Friend in room for support. Continue POC.

## 2020-08-03 NOTE — PLAN OF CARE
VSS. Tylenol given for adequate pain relief overnight. Formula feeding infant. Independent, voiding adequately. Continue with plan of care.

## 2020-08-03 NOTE — PROGRESS NOTES
"D:  Received order for SW to see for \"scant PNC\".    I:  Progress notes from this admission reviewed.  Consulted with nursing.      A:  Chart indicates patient is coping well postpartum.  Nursing identifies no SW concerns.      P:  Nursing asked patient if she would like a SW visit to discuss resources.  Patient declines SW visit.   Order closed.  Please refer again if needs or concerns arise prior to discharge.   "

## 2020-08-04 NOTE — PLAN OF CARE
Vital signs stable, assessments within normal limits. Review of care plan, teaching, and discharge instructions done with patient.  All questions addressed.  Patient verbalized understanding of after visit summary, recommended follow up, and was given an opportunity to as questions. Discharge meds were sent home with patient. Patient discharged on 08/03/2020.

## 2020-08-06 LAB — COPATH REPORT: NORMAL

## 2020-09-29 ENCOUNTER — MEDICAL CORRESPONDENCE (OUTPATIENT)
Dept: HEALTH INFORMATION MANAGEMENT | Facility: CLINIC | Age: 20
End: 2020-09-29